# Patient Record
Sex: MALE | Race: WHITE | Employment: FULL TIME | URBAN - METROPOLITAN AREA
[De-identification: names, ages, dates, MRNs, and addresses within clinical notes are randomized per-mention and may not be internally consistent; named-entity substitution may affect disease eponyms.]

---

## 2017-02-04 ENCOUNTER — OFFICE VISIT (OUTPATIENT)
Dept: FAMILY MEDICINE CLINIC | Facility: CLINIC | Age: 22
End: 2017-02-04

## 2017-02-04 VITALS
OXYGEN SATURATION: 96 % | HEART RATE: 96 BPM | DIASTOLIC BLOOD PRESSURE: 66 MMHG | BODY MASS INDEX: 29.16 KG/M2 | TEMPERATURE: 99 F | SYSTOLIC BLOOD PRESSURE: 112 MMHG | HEIGHT: 78 IN | RESPIRATION RATE: 16 BRPM | WEIGHT: 252 LBS

## 2017-02-04 DIAGNOSIS — J01.00 ACUTE MAXILLARY SINUSITIS, RECURRENCE NOT SPECIFIED: Primary | ICD-10-CM

## 2017-02-04 PROCEDURE — 99213 OFFICE O/P EST LOW 20 MIN: CPT | Performed by: NURSE PRACTITIONER

## 2017-02-04 RX ORDER — CODEINE PHOSPHATE AND GUAIFENESIN 10; 100 MG/5ML; MG/5ML
5 SOLUTION ORAL NIGHTLY PRN
Qty: 118 ML | Refills: 0 | Status: SHIPPED | OUTPATIENT
Start: 2017-02-04 | End: 2017-02-11

## 2017-02-04 RX ORDER — AMOXICILLIN AND CLAVULANATE POTASSIUM 875; 125 MG/1; MG/1
1 TABLET, FILM COATED ORAL 2 TIMES DAILY
Qty: 20 TABLET | Refills: 0 | Status: SHIPPED | OUTPATIENT
Start: 2017-02-04 | End: 2017-02-14

## 2017-02-04 NOTE — PATIENT INSTRUCTIONS
Humidifier in room  Sleep propped  Push fluids  Limit dairy  Mucinex as directed  Sudafed as needed  Start antibiotics in 2 days for worsening symptoms    Sinusitis (No Antibiotics)    The sinuses are air-filled spaces within the bones of the face.  They · Use acetaminophen or ibuprofen to control pain, unless another pain medicine was prescribed. (If you have chronic liver or kidney disease or ever had a stomach ulcer, talk with your doctor before using these medicines.  Aspirin should never be used in any

## 2017-02-04 NOTE — PROGRESS NOTES
CHIEF COMPLAINT:   Patient presents with:  Cough: PROD  COUGH , WHEEZING , CHEST CONGESTION , SORE THROAT , PAINFUL EYES X 5 DAYS      HPI:   Marjorie Gay is a 24year old male who presents for cold symptoms for  5  days.  Symptoms have progressed into s REVIEW OF SYSTEMS:   GENERAL:  No change in appetite  SKIN: no rashes or abnormal skin lesions  HEENT: See HPI.     LUNGS: denies shortness of breath or wheezing, See HPI  CARDIOVASCULAR: denies chest pain or palpitations   GI: denies N/V/C or abdominal pawel Risks, benefits, side effects of medication addressed and explained.     Patient Instructions     Humidifier in room  Sleep propped  Push fluids  Limit dairy  Mucinex as directed  Sudafed as needed  Start antibiotics in 2 days for worsening symptoms    Sinu · Use acetaminophen or ibuprofen to control pain, unless another pain medicine was prescribed. (If you have chronic liver or kidney disease or ever had a stomach ulcer, talk with your doctor before using these medicines.  Aspirin should never be used in any

## 2018-01-04 ENCOUNTER — OFFICE VISIT (OUTPATIENT)
Dept: FAMILY MEDICINE CLINIC | Facility: CLINIC | Age: 23
End: 2018-01-04

## 2018-01-04 VITALS
RESPIRATION RATE: 20 BRPM | OXYGEN SATURATION: 98 % | HEART RATE: 75 BPM | SYSTOLIC BLOOD PRESSURE: 106 MMHG | TEMPERATURE: 98 F | DIASTOLIC BLOOD PRESSURE: 72 MMHG | BODY MASS INDEX: 29.16 KG/M2 | HEIGHT: 78 IN | WEIGHT: 252 LBS

## 2018-01-04 DIAGNOSIS — J00 ACUTE NASOPHARYNGITIS: Primary | ICD-10-CM

## 2018-01-04 PROCEDURE — 99213 OFFICE O/P EST LOW 20 MIN: CPT | Performed by: NURSE PRACTITIONER

## 2018-01-04 NOTE — PROGRESS NOTES
HPI:   Blake Ley is a 25year old male who presents with ill symptoms for  6  days. Patient reports congestion, brown/green colored nasal discharge, dry cough, OTC cold meds have been helping, prior history of sinusitis, denies fever.  Has tried [employer] orders for this visit:    Acute nasopharyngitis      Self care discussed. Medication use and risk/benefit discussed. Patient Instructions   Self care for viral illnesses:  · Salt water gargles (1 tsp.  Salt in 6 oz lukewarm water), use several times daily

## 2018-01-04 NOTE — PATIENT INSTRUCTIONS
Self care for viral illnesses:  · Salt water gargles (1 tsp. Salt in 6 oz lukewarm water), use several times daily to help decrease swelling and pain in throat and to remove post nasal drip.   · Saline nasal spray to nostrils if needed to help remove draina

## 2018-09-07 ENCOUNTER — OFFICE VISIT (OUTPATIENT)
Dept: FAMILY MEDICINE CLINIC | Facility: CLINIC | Age: 23
End: 2018-09-07
Payer: COMMERCIAL

## 2018-09-07 VITALS
RESPIRATION RATE: 18 BRPM | WEIGHT: 235 LBS | TEMPERATURE: 98 F | BODY MASS INDEX: 27.19 KG/M2 | HEIGHT: 78 IN | SYSTOLIC BLOOD PRESSURE: 122 MMHG | HEART RATE: 86 BPM | OXYGEN SATURATION: 99 % | DIASTOLIC BLOOD PRESSURE: 68 MMHG

## 2018-09-07 DIAGNOSIS — J02.9 SORE THROAT: Primary | ICD-10-CM

## 2018-09-07 DIAGNOSIS — R68.89 FLU-LIKE SYMPTOMS: ICD-10-CM

## 2018-09-07 LAB — CONTROL LINE PRESENT WITH A CLEAR BACKGROUND (YES/NO): YES YES/NO

## 2018-09-07 PROCEDURE — 87880 STREP A ASSAY W/OPTIC: CPT | Performed by: NURSE PRACTITIONER

## 2018-09-07 PROCEDURE — 99213 OFFICE O/P EST LOW 20 MIN: CPT | Performed by: NURSE PRACTITIONER

## 2018-09-07 RX ORDER — AZITHROMYCIN 250 MG/1
TABLET, FILM COATED ORAL
Qty: 6 TABLET | Refills: 0 | Status: SHIPPED | OUTPATIENT
Start: 2018-09-07 | End: 2019-05-20

## 2018-09-07 NOTE — PROGRESS NOTES
HPI:   Sasha Solorzano is a 21year old male who presents with ill symptoms for  5  days. Patient reports started as body aches, chills, dry cough, sore throat, fever, congestion, most symptoms improved except for severe sore throat pain.  Has tried Nyquil, RRR without murmur  GI: good BS's,no masses, HSM or tenderness    Results for orders placed or performed in visit on 09/07/18  -STREP A ASSAY W/OPTIC   Collection Time: 09/07/18  9:44 AM   Result Value Ref Range   STREP GRP A CUL-SCR NEG Negative   Control with others. · Cepacol lozenges or other throat drops may help soothe as well during the day. · May use Tylenol 650 mg every four to six hours OR Ibuprofen 600 mg every six hours over the counter for pain/comfort if needed.  May stop Nyquil and Theraflu t

## 2018-09-07 NOTE — PATIENT INSTRUCTIONS
·  PLAN: Zithromax, take as directed. Finish all the medication even if you feel better. · Probiotics or yogurt daily during antibiotic use will help decrease stomach upset and restore good bacteria to the gut.  Take with a meal separate from the antibiot

## 2019-05-20 ENCOUNTER — APPOINTMENT (OUTPATIENT)
Dept: GENERAL RADIOLOGY | Age: 24
End: 2019-05-20
Attending: FAMILY MEDICINE
Payer: COMMERCIAL

## 2019-05-20 ENCOUNTER — HOSPITAL ENCOUNTER (OUTPATIENT)
Age: 24
Discharge: HOME | End: 2019-05-20
Attending: FAMILY MEDICINE
Payer: COMMERCIAL

## 2019-05-20 VITALS
DIASTOLIC BLOOD PRESSURE: 75 MMHG | HEART RATE: 65 BPM | SYSTOLIC BLOOD PRESSURE: 133 MMHG | TEMPERATURE: 98 F | RESPIRATION RATE: 18 BRPM | OXYGEN SATURATION: 99 %

## 2019-05-20 DIAGNOSIS — S46.912A STRAIN OF LEFT SHOULDER, INITIAL ENCOUNTER: Primary | ICD-10-CM
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
## 2019-05-20 DIAGNOSIS — S30.1XXA CONTUSION OF ABDOMINAL WALL, INITIAL ENCOUNTER: ICD-10-CM

## 2019-05-20 DIAGNOSIS — S49.92XA INJURY OF LEFT SHOULDER, INITIAL ENCOUNTER: ICD-10-CM

## 2019-05-20 PROCEDURE — 73030 X-RAY EXAM OF SHOULDER: CPT | Performed by: FAMILY MEDICINE

## 2019-05-20 PROCEDURE — 99213 OFFICE O/P EST LOW 20 MIN: CPT

## 2019-05-20 PROCEDURE — 99203 OFFICE O/P NEW LOW 30 MIN: CPT

## 2019-05-20 NOTE — ED PROVIDER NOTES
Patient Seen in: THE [facility]    History   Patient presents with:  Shoulder Pain    Stated Complaint: SHOULDER PAIN X 1 DAY    HPI    This 71-year-old male presents to the office with injury to the left shoulder which occurred yes 133/75   Pulse 65   Temp 98.2 °F (36.8 °C) (Oral)   Resp 18   SpO2 99%         Physical Exam    General: WH/WN/WD, in NAD, but favoring the left arm, A and O times 3  HEAD: Normocephalic, atraumatic  EYES: LAVELL, EOMI,sclera anicteric,  conjunctiva normal. 5/20/2019  PROCEDURE:  XR SHOULDER, COMPLETE (MIN 2 VIEWS), LEFT (CPT=73030)     TECHNIQUE:  Multiple views were obtained. COMPARISON:  None.   INDICATIONS:  pain/injury  PATIENT STATED HISTORY: (As transcribed by Technologist)  Patient states that he has breakfast and dinner for pain and inflammation. Apply ice to the shoulder 10 to 15 minutes at a time 2-3 times a day. Always ice through a towel or clothing, never directly on bare skin to avoid frostbite.   Use your sling during the daytime and take it o
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
## (undated) NOTE — LETTER
[facility] IN [place]  [address] [address]  Dept: [phone]  Dept Fax: [phone]         May 20, 2019    Patient: Lillian Hernandez   YOB: 1995   Date of Visit: 5/20/2019       To Whom It May Concern:

## (undated) NOTE — Clinical Note
Dear Dr. Eubanks,       Thank you for referring Bo Obando to the [facility].   Sincerely,  SEAN Ocasio

## (undated) NOTE — MR AVS SNAPSHOT
[facility]  [address]  [phone]               Thank you for choosing us for your health care visit with Crow Ma NP. We are glad to serve you and happy to provide you with this summary of your visit.   Ple prescribed. Nasal sprays work the fastest. Use one that contains phenylephrine or oxymetazoline. First blow the nose gently. Then use the spray. Do not use these medicines more often than directed on the label or symptoms may get worse.  You may also use ta professional's instructions.              Allergies as of Feb 04, 2017     No Known Allergies                Today's Vital Signs     BP Pulse Temp Height Weight BMI    112/66 mmHg 96 98.5 °F (36.9 °C) (Oral) 78\" 252 lb 29.13 kg/m2         Current Medicatio Eat plenty of protein, keep the fat content low Sugars:  sodas and sports drinks, candies and desserts   Eat plenty of low-fat dairy products High fat meats and dairy   Choose whole grain products Foods high in sodium   Water is best for hydration Fast inder

## (undated) NOTE — LETTER
Date: 9/7/2018    Patient Name: Manuel Riley          To Whom it may concern: The above patient was seen at the [facility] for treatment of a medical condition. This patient should be excused from attending work  through 9/8/18.     Francie Orourke